# Patient Record
Sex: FEMALE | Race: OTHER | HISPANIC OR LATINO | URBAN - METROPOLITAN AREA
[De-identification: names, ages, dates, MRNs, and addresses within clinical notes are randomized per-mention and may not be internally consistent; named-entity substitution may affect disease eponyms.]

---

## 2023-11-18 ENCOUNTER — INPATIENT (INPATIENT)
Facility: HOSPITAL | Age: 33
LOS: 2 days | Discharge: HOME | DRG: 103 | End: 2023-11-21
Attending: PSYCHIATRY & NEUROLOGY | Admitting: HOSPITALIST
Payer: MEDICAID

## 2023-11-18 VITALS
TEMPERATURE: 98 F | DIASTOLIC BLOOD PRESSURE: 86 MMHG | HEART RATE: 72 BPM | SYSTOLIC BLOOD PRESSURE: 124 MMHG | RESPIRATION RATE: 18 BRPM | OXYGEN SATURATION: 95 %

## 2023-11-18 LAB
ALBUMIN SERPL ELPH-MCNC: 3.3 G/DL — LOW (ref 3.4–5)
ALBUMIN SERPL ELPH-MCNC: 3.3 G/DL — LOW (ref 3.4–5)
ALP SERPL-CCNC: 66 U/L — SIGNIFICANT CHANGE UP (ref 40–120)
ALP SERPL-CCNC: 66 U/L — SIGNIFICANT CHANGE UP (ref 40–120)
ALT FLD-CCNC: 24 U/L — SIGNIFICANT CHANGE UP (ref 12–42)
ALT FLD-CCNC: 24 U/L — SIGNIFICANT CHANGE UP (ref 12–42)
AMPHET UR-MCNC: NEGATIVE — SIGNIFICANT CHANGE UP
AMPHET UR-MCNC: NEGATIVE — SIGNIFICANT CHANGE UP
ANION GAP SERPL CALC-SCNC: 9 MMOL/L — SIGNIFICANT CHANGE UP (ref 9–16)
ANION GAP SERPL CALC-SCNC: 9 MMOL/L — SIGNIFICANT CHANGE UP (ref 9–16)
APPEARANCE UR: CLEAR — SIGNIFICANT CHANGE UP
APPEARANCE UR: CLEAR — SIGNIFICANT CHANGE UP
AST SERPL-CCNC: 13 U/L — LOW (ref 15–37)
AST SERPL-CCNC: 13 U/L — LOW (ref 15–37)
BARBITURATES UR SCN-MCNC: NEGATIVE — SIGNIFICANT CHANGE UP
BARBITURATES UR SCN-MCNC: NEGATIVE — SIGNIFICANT CHANGE UP
BASOPHILS # BLD AUTO: 0.1 K/UL — SIGNIFICANT CHANGE UP (ref 0–0.2)
BASOPHILS # BLD AUTO: 0.1 K/UL — SIGNIFICANT CHANGE UP (ref 0–0.2)
BASOPHILS NFR BLD AUTO: 0.9 % — SIGNIFICANT CHANGE UP (ref 0–2)
BASOPHILS NFR BLD AUTO: 0.9 % — SIGNIFICANT CHANGE UP (ref 0–2)
BENZODIAZ UR-MCNC: NEGATIVE — SIGNIFICANT CHANGE UP
BENZODIAZ UR-MCNC: NEGATIVE — SIGNIFICANT CHANGE UP
BILIRUB SERPL-MCNC: 0.4 MG/DL — SIGNIFICANT CHANGE UP (ref 0.2–1.2)
BILIRUB SERPL-MCNC: 0.4 MG/DL — SIGNIFICANT CHANGE UP (ref 0.2–1.2)
BILIRUB UR-MCNC: NEGATIVE — SIGNIFICANT CHANGE UP
BILIRUB UR-MCNC: NEGATIVE — SIGNIFICANT CHANGE UP
BUN SERPL-MCNC: 9 MG/DL — SIGNIFICANT CHANGE UP (ref 7–23)
BUN SERPL-MCNC: 9 MG/DL — SIGNIFICANT CHANGE UP (ref 7–23)
CALCIUM SERPL-MCNC: 9.1 MG/DL — SIGNIFICANT CHANGE UP (ref 8.5–10.5)
CALCIUM SERPL-MCNC: 9.1 MG/DL — SIGNIFICANT CHANGE UP (ref 8.5–10.5)
CHLORIDE SERPL-SCNC: 101 MMOL/L — SIGNIFICANT CHANGE UP (ref 96–108)
CHLORIDE SERPL-SCNC: 101 MMOL/L — SIGNIFICANT CHANGE UP (ref 96–108)
CO2 SERPL-SCNC: 20 MMOL/L — LOW (ref 22–31)
CO2 SERPL-SCNC: 20 MMOL/L — LOW (ref 22–31)
COCAINE METAB.OTHER UR-MCNC: NEGATIVE — SIGNIFICANT CHANGE UP
COCAINE METAB.OTHER UR-MCNC: NEGATIVE — SIGNIFICANT CHANGE UP
COLOR SPEC: YELLOW — SIGNIFICANT CHANGE UP
COLOR SPEC: YELLOW — SIGNIFICANT CHANGE UP
CREAT SERPL-MCNC: 0.87 MG/DL — SIGNIFICANT CHANGE UP (ref 0.5–1.3)
CREAT SERPL-MCNC: 0.87 MG/DL — SIGNIFICANT CHANGE UP (ref 0.5–1.3)
DIFF PNL FLD: ABNORMAL
DIFF PNL FLD: ABNORMAL
EGFR: 89 ML/MIN/1.73M2 — SIGNIFICANT CHANGE UP
EGFR: 89 ML/MIN/1.73M2 — SIGNIFICANT CHANGE UP
EOSINOPHIL # BLD AUTO: 0.38 K/UL — SIGNIFICANT CHANGE UP (ref 0–0.5)
EOSINOPHIL # BLD AUTO: 0.38 K/UL — SIGNIFICANT CHANGE UP (ref 0–0.5)
EOSINOPHIL NFR BLD AUTO: 3.5 % — SIGNIFICANT CHANGE UP (ref 0–6)
EOSINOPHIL NFR BLD AUTO: 3.5 % — SIGNIFICANT CHANGE UP (ref 0–6)
EPI CELLS # UR: PRESENT
EPI CELLS # UR: PRESENT
ETHANOL SERPL-MCNC: <3 MG/DL — SIGNIFICANT CHANGE UP
ETHANOL SERPL-MCNC: <3 MG/DL — SIGNIFICANT CHANGE UP
GLUCOSE SERPL-MCNC: 91 MG/DL — SIGNIFICANT CHANGE UP (ref 70–99)
GLUCOSE SERPL-MCNC: 91 MG/DL — SIGNIFICANT CHANGE UP (ref 70–99)
GLUCOSE UR QL: NEGATIVE MG/DL — SIGNIFICANT CHANGE UP
GLUCOSE UR QL: NEGATIVE MG/DL — SIGNIFICANT CHANGE UP
HCG UR QL: NEGATIVE — SIGNIFICANT CHANGE UP
HCG UR QL: NEGATIVE — SIGNIFICANT CHANGE UP
HCT VFR BLD CALC: 41.3 % — SIGNIFICANT CHANGE UP (ref 34.5–45)
HCT VFR BLD CALC: 41.3 % — SIGNIFICANT CHANGE UP (ref 34.5–45)
HGB BLD-MCNC: 13.7 G/DL — SIGNIFICANT CHANGE UP (ref 11.5–15.5)
HGB BLD-MCNC: 13.7 G/DL — SIGNIFICANT CHANGE UP (ref 11.5–15.5)
IMM GRANULOCYTES NFR BLD AUTO: 0.6 % — SIGNIFICANT CHANGE UP (ref 0–0.9)
IMM GRANULOCYTES NFR BLD AUTO: 0.6 % — SIGNIFICANT CHANGE UP (ref 0–0.9)
KETONES UR-MCNC: NEGATIVE MG/DL — SIGNIFICANT CHANGE UP
KETONES UR-MCNC: NEGATIVE MG/DL — SIGNIFICANT CHANGE UP
LEUKOCYTE ESTERASE UR-ACNC: ABNORMAL
LEUKOCYTE ESTERASE UR-ACNC: ABNORMAL
LYMPHOCYTES # BLD AUTO: 2.3 K/UL — SIGNIFICANT CHANGE UP (ref 1–3.3)
LYMPHOCYTES # BLD AUTO: 2.3 K/UL — SIGNIFICANT CHANGE UP (ref 1–3.3)
LYMPHOCYTES # BLD AUTO: 21.1 % — SIGNIFICANT CHANGE UP (ref 13–44)
LYMPHOCYTES # BLD AUTO: 21.1 % — SIGNIFICANT CHANGE UP (ref 13–44)
MCHC RBC-ENTMCNC: 27.9 PG — SIGNIFICANT CHANGE UP (ref 27–34)
MCHC RBC-ENTMCNC: 27.9 PG — SIGNIFICANT CHANGE UP (ref 27–34)
MCHC RBC-ENTMCNC: 33.2 GM/DL — SIGNIFICANT CHANGE UP (ref 32–36)
MCHC RBC-ENTMCNC: 33.2 GM/DL — SIGNIFICANT CHANGE UP (ref 32–36)
MCV RBC AUTO: 84.1 FL — SIGNIFICANT CHANGE UP (ref 80–100)
MCV RBC AUTO: 84.1 FL — SIGNIFICANT CHANGE UP (ref 80–100)
METHADONE UR-MCNC: NEGATIVE — SIGNIFICANT CHANGE UP
METHADONE UR-MCNC: NEGATIVE — SIGNIFICANT CHANGE UP
MONOCYTES # BLD AUTO: 0.73 K/UL — SIGNIFICANT CHANGE UP (ref 0–0.9)
MONOCYTES # BLD AUTO: 0.73 K/UL — SIGNIFICANT CHANGE UP (ref 0–0.9)
MONOCYTES NFR BLD AUTO: 6.7 % — SIGNIFICANT CHANGE UP (ref 2–14)
MONOCYTES NFR BLD AUTO: 6.7 % — SIGNIFICANT CHANGE UP (ref 2–14)
NEUTROPHILS # BLD AUTO: 7.31 K/UL — SIGNIFICANT CHANGE UP (ref 1.8–7.4)
NEUTROPHILS # BLD AUTO: 7.31 K/UL — SIGNIFICANT CHANGE UP (ref 1.8–7.4)
NEUTROPHILS NFR BLD AUTO: 67.2 % — SIGNIFICANT CHANGE UP (ref 43–77)
NEUTROPHILS NFR BLD AUTO: 67.2 % — SIGNIFICANT CHANGE UP (ref 43–77)
NITRITE UR-MCNC: NEGATIVE — SIGNIFICANT CHANGE UP
NITRITE UR-MCNC: NEGATIVE — SIGNIFICANT CHANGE UP
NRBC # BLD: 0 /100 WBCS — SIGNIFICANT CHANGE UP (ref 0–0)
NRBC # BLD: 0 /100 WBCS — SIGNIFICANT CHANGE UP (ref 0–0)
OPIATES UR-MCNC: NEGATIVE — SIGNIFICANT CHANGE UP
OPIATES UR-MCNC: NEGATIVE — SIGNIFICANT CHANGE UP
PCP SPEC-MCNC: SIGNIFICANT CHANGE UP
PCP SPEC-MCNC: SIGNIFICANT CHANGE UP
PCP UR-MCNC: NEGATIVE — SIGNIFICANT CHANGE UP
PCP UR-MCNC: NEGATIVE — SIGNIFICANT CHANGE UP
PH UR: 5.5 — SIGNIFICANT CHANGE UP (ref 5–8)
PH UR: 5.5 — SIGNIFICANT CHANGE UP (ref 5–8)
PLATELET # BLD AUTO: 360 K/UL — SIGNIFICANT CHANGE UP (ref 150–400)
PLATELET # BLD AUTO: 360 K/UL — SIGNIFICANT CHANGE UP (ref 150–400)
POTASSIUM SERPL-MCNC: 3.8 MMOL/L — SIGNIFICANT CHANGE UP (ref 3.5–5.3)
POTASSIUM SERPL-MCNC: 3.8 MMOL/L — SIGNIFICANT CHANGE UP (ref 3.5–5.3)
POTASSIUM SERPL-SCNC: 3.8 MMOL/L — SIGNIFICANT CHANGE UP (ref 3.5–5.3)
POTASSIUM SERPL-SCNC: 3.8 MMOL/L — SIGNIFICANT CHANGE UP (ref 3.5–5.3)
PROT SERPL-MCNC: 8.2 G/DL — SIGNIFICANT CHANGE UP (ref 6.4–8.2)
PROT SERPL-MCNC: 8.2 G/DL — SIGNIFICANT CHANGE UP (ref 6.4–8.2)
PROT UR-MCNC: NEGATIVE MG/DL — SIGNIFICANT CHANGE UP
PROT UR-MCNC: NEGATIVE MG/DL — SIGNIFICANT CHANGE UP
RBC # BLD: 4.91 M/UL — SIGNIFICANT CHANGE UP (ref 3.8–5.2)
RBC # BLD: 4.91 M/UL — SIGNIFICANT CHANGE UP (ref 3.8–5.2)
RBC # FLD: 13.7 % — SIGNIFICANT CHANGE UP (ref 10.3–14.5)
RBC # FLD: 13.7 % — SIGNIFICANT CHANGE UP (ref 10.3–14.5)
RBC CASTS # UR COMP ASSIST: 1 /HPF — SIGNIFICANT CHANGE UP (ref 0–4)
RBC CASTS # UR COMP ASSIST: 1 /HPF — SIGNIFICANT CHANGE UP (ref 0–4)
SODIUM SERPL-SCNC: 130 MMOL/L — LOW (ref 132–145)
SODIUM SERPL-SCNC: 130 MMOL/L — LOW (ref 132–145)
SP GR SPEC: 1.03 — HIGH (ref 1–1.03)
SP GR SPEC: 1.03 — HIGH (ref 1–1.03)
THC UR QL: NEGATIVE — SIGNIFICANT CHANGE UP
THC UR QL: NEGATIVE — SIGNIFICANT CHANGE UP
TROPONIN I, HIGH SENSITIVITY RESULT: 4.5 NG/L — SIGNIFICANT CHANGE UP
TROPONIN I, HIGH SENSITIVITY RESULT: 4.5 NG/L — SIGNIFICANT CHANGE UP
UROBILINOGEN FLD QL: 0.2 MG/DL — SIGNIFICANT CHANGE UP (ref 0.2–1)
UROBILINOGEN FLD QL: 0.2 MG/DL — SIGNIFICANT CHANGE UP (ref 0.2–1)
WBC # BLD: 10.89 K/UL — HIGH (ref 3.8–10.5)
WBC # BLD: 10.89 K/UL — HIGH (ref 3.8–10.5)
WBC # FLD AUTO: 10.89 K/UL — HIGH (ref 3.8–10.5)
WBC # FLD AUTO: 10.89 K/UL — HIGH (ref 3.8–10.5)
WBC UR QL: 4 /HPF — SIGNIFICANT CHANGE UP (ref 0–5)
WBC UR QL: 4 /HPF — SIGNIFICANT CHANGE UP (ref 0–5)

## 2023-11-18 PROCEDURE — G0426: CPT

## 2023-11-18 PROCEDURE — 70498 CT ANGIOGRAPHY NECK: CPT | Mod: 26

## 2023-11-18 PROCEDURE — 70450 CT HEAD/BRAIN W/O DYE: CPT | Mod: 26,59

## 2023-11-18 PROCEDURE — ZZZZZ: CPT

## 2023-11-18 PROCEDURE — 99291 CRITICAL CARE FIRST HOUR: CPT

## 2023-11-18 PROCEDURE — 0042T: CPT

## 2023-11-18 PROCEDURE — 70496 CT ANGIOGRAPHY HEAD: CPT | Mod: 26

## 2023-11-18 RX ORDER — MAGNESIUM SULFATE 500 MG/ML
1 VIAL (ML) INJECTION ONCE
Refills: 0 | Status: COMPLETED | OUTPATIENT
Start: 2023-11-18 | End: 2023-11-18

## 2023-11-18 RX ORDER — LIDOCAINE 4 G/100G
1 CREAM TOPICAL EVERY 24 HOURS
Refills: 0 | Status: DISCONTINUED | OUTPATIENT
Start: 2023-11-18 | End: 2023-11-21

## 2023-11-18 RX ORDER — ACETAMINOPHEN 500 MG
1000 TABLET ORAL ONCE
Refills: 0 | Status: COMPLETED | OUTPATIENT
Start: 2023-11-18 | End: 2023-11-18

## 2023-11-18 RX ORDER — ACETAMINOPHEN 500 MG
650 TABLET ORAL EVERY 6 HOURS
Refills: 0 | Status: DISCONTINUED | OUTPATIENT
Start: 2023-11-18 | End: 2023-11-19

## 2023-11-18 RX ORDER — ONDANSETRON 8 MG/1
4 TABLET, FILM COATED ORAL ONCE
Refills: 0 | Status: COMPLETED | OUTPATIENT
Start: 2023-11-18 | End: 2023-11-18

## 2023-11-18 RX ORDER — TRAMADOL HYDROCHLORIDE 50 MG/1
25 TABLET ORAL ONCE
Refills: 0 | Status: DISCONTINUED | OUTPATIENT
Start: 2023-11-18 | End: 2023-11-18

## 2023-11-18 RX ADMIN — LIDOCAINE 1 PATCH: 4 CREAM TOPICAL at 23:01

## 2023-11-18 RX ADMIN — LIDOCAINE 1 PATCH: 4 CREAM TOPICAL at 23:00

## 2023-11-18 RX ADMIN — ONDANSETRON 4 MILLIGRAM(S): 8 TABLET, FILM COATED ORAL at 21:37

## 2023-11-18 RX ADMIN — TRAMADOL HYDROCHLORIDE 25 MILLIGRAM(S): 50 TABLET ORAL at 23:00

## 2023-11-18 RX ADMIN — Medication 400 MILLIGRAM(S): at 20:46

## 2023-11-18 RX ADMIN — Medication 1000 MILLIGRAM(S): at 21:15

## 2023-11-18 RX ADMIN — Medication 100 GRAM(S): at 21:37

## 2023-11-18 NOTE — ED PROVIDER NOTE - OBJECTIVE STATEMENT
: [id number]  32yo otherwise healthy F presents with c/o atypical headache localized to the left side with right sided facial droop. also describes blurry vision and generalized weakness. symptoms started 1 hour pta. denies any similar prior episodes. no meds taken pta. : [id number]  32yo otherwise healthy F presents with c/o atypical headache localized to the left side with right sided facial droop. also describes blurry vision and generalized weakness. symptoms started 1 hour pta after going to the bathroom and were accompanied by 30 minutes aphasia which resolved pta. denies any similar prior episodes. no meds taken pta.

## 2023-11-18 NOTE — ED PROVIDER NOTE - CLINICAL SUMMARY MEDICAL DECISION MAKING FREE TEXT BOX
pt presents with c/o headache, vision changes, full body weakness. noted with right nasolabial fold flattening. NIHSS 1 on arrival, improved to 0 when evaluated with telestroke Dr. Mendez who recommends admission. d/w Dr. Melania Price (neuro) who agrees to accept admission.

## 2023-11-18 NOTE — ED PROVIDER NOTE - CRANIAL NERVE AND PUPILLARY EXAM
flattening of the right nasolabial fold, otherwise CN II-XII intact/central and peripheral vision intact/extra-ocular movements intact/tongue is midline

## 2023-11-18 NOTE — PATIENT PROFILE ADULT - FALL HARM RISK - HARM RISK INTERVENTIONS
Assistance with ambulation/Assistance OOB with selected safe patient handling equipment/Communicate Risk of Fall with Harm to all staff/Monitor gait and stability/Reinforce activity limits and safety measures with patient and family/Sit up slowly, dangle for a short time, stand at bedside before walking/Tailored Fall Risk Interventions/Visual Cue: Yellow wristband and red socks/Bed in lowest position, wheels locked, appropriate side rails in place/Call bell, personal items and telephone in reach/Instruct patient to call for assistance before getting out of bed or chair/Non-slip footwear when patient is out of bed/[place] to call system/Physically safe environment - no spills, clutter or unnecessary equipment/Purposeful Proactive Rounding/Room/bathroom lighting operational, light cord in reach

## 2023-11-18 NOTE — ED ADULT NURSE NOTE - OBJECTIVE STATEMENT
c/o headache and left sided weakness LKW 1hr PTA.  code stroke activated. pt states to Dr Huynh she had 30min of loss of speech . c/o headache and left sided weakness after syncopal episode while in the bathroom, LKW 1hr PTA approx 15:32.  code stroke activated. pt states to Dr Huynh she had 30min of loss of speech after syncopal episode .  Analy [id number].

## 2023-11-18 NOTE — STROKE CODE NOTE - ASSESSMENT/PLAN
33 year old previously healthy , history of anxiety, 33 year old previously healthy , history of anxiety, 1 hour prior to presentation, while she was in the bathroom, she felt nauseous and started trembling and had an episode of syncope. She said that she did not hit her head. She has history of " migraine" she has been having headache for the last 3 days. She said that that when she could not speak or understand language for 30 minutes after the syncope.  In the ED, her NIHSS was 0. She has baseline strabismus.  CT head no ICH , no acute abnormality   CT PERFUSION: Within normal limits    CTA INTRACRANIAL: Within normal limits. No arterial steno-occlusive   disease.    CTA EXTRACRANIAL: Within normal limits. No arterial steno-occlusive   disease or evidence of dissection.    not tA candidat2 given NIHSS0   r/o stroke, would get MRI brain   hold aspirin for now until the MRI, given headache, less likely to be SAH given no acute onset headache   syncope workup, EEG     Transfer to  for further workup  in house neurology consult for further management and workup     Denice Huynh  Plan discussed with ED team

## 2023-11-18 NOTE — STROKE CODE NOTE - IV ALTEPLASE EXCL REL HIDDEN
Problem: Potential for Falls  Goal: Patient will remain free of falls  Description: INTERVENTIONS:  - Educate patient/family on patient safety including physical limitations  - Instruct patient to call for assistance with activity   - Consult OT/PT to assist with strengthening/mobility   - Keep Call bell within reach  - Keep bed low and locked with side rails adjusted as appropriate  - Keep care items and personal belongings within reach  - Initiate and maintain comfort rounds  - Make Fall Risk Sign visible to staff  - Offer Toileting every  Hours, in advance of need  - Initiate/Maintain alarm  - Obtain necessary fall risk management equipment  - Apply yellow socks and bracelet for high fall risk patients  - Consider moving patient to room near nurses station  Outcome: Progressing     Problem: MOBILITY - ADULT  Goal: Maintain or return to baseline ADL function  Description: INTERVENTIONS:  -  Assess patient's ability to carry out ADLs; assess patient's baseline for ADL function and identify physical deficits which impact ability to perform ADLs (bathing, care of mouth/teeth, toileting, grooming, dressing, etc )  - Assess/evaluate cause of self-care deficits   - Assess range of motion  - Assess patient's mobility; develop plan if impaired  - Assess patient's need for assistive devices and provide as appropriate  - Encourage maximum independence but intervene and supervise when necessary  - Involve family in performance of ADLs  - Assess for home care needs following discharge   - Consider OT consult to assist with ADL evaluation and planning for discharge  - Provide patient education as appropriate  Outcome: Progressing     Problem: PAIN - ADULT  Goal: Verbalizes/displays adequate comfort level or baseline comfort level  Description: Interventions:  - Encourage patient to monitor pain and request assistance  - Assess pain using appropriate pain scale  - Administer analgesics based on type and severity of pain and show evaluate response  - Implement non-pharmacological measures as appropriate and evaluate response  - Consider cultural and social influences on pain and pain management  - Notify physician/advanced practitioner if interventions unsuccessful or patient reports new pain  Outcome: Progressing

## 2023-11-18 NOTE — ED PROVIDER NOTE - CRITICAL CARE ATTENDING CONTRIBUTION TO CARE
The patient was seen immediately upon arrival due to a high probability of imminent or life-threatening deterioration secondary to r/o cva , which required my direct attention, intervention, and personal management at the bedside. I have personally provided critical care time exclusive of time spent on separately billable procedures. Time includes review of laboratory data, radiology results, discussion with consultants, discussion with the patient's family, and monitoring for potential decompensation.

## 2023-11-18 NOTE — H&P ADULT - NSHPLABSRESULTS_GEN_ALL_CORE
Fingerstick Blood Glucose: CAPILLARY BLOOD GLUCOSE      POCT Blood Glucose.: 80 mg/dL (2023 16:29)    LABS:                        13.7   10.89 )-----------( 360      ( 2023 16:28 )             41.3     1118    130<L>  |  101  |  9   ----------------------------<  91  3.8   |  20<L>  |  0.87    Ca    9.1      2023 16:28    TPro  8.2  /  Alb  3.3<L>  /  TBili  0.4  /  DBili  x   /  AST  13<L>  /  ALT  24  /  AlkPhos  66  18          Urinalysis Basic - ( 2023 16:29 )    Color: Yellow / Appearance: Clear / S.034 / pH: x  Gluc: x / Ketone: Negative mg/dL  / Bili: Negative / Urobili: 0.2 mg/dL   Blood: x / Protein: Negative mg/dL / Nitrite: Negative   Leuk Esterase: Small / RBC: 1 /HPF / WBC 4 /HPF   Sq Epi: x / Non Sq Epi: x / Bacteria: x        RADIOLOGY  < from: CT Brain Stroke Protocol (23 @ 16:37) >    IMPRESSION:  No acute intracranial hemorrhage, demarcated territorial infarct, or   masslike lesion.    < end of copied text >    < from: CT Angio Neck Stroke Protocol w/ IV Cont (23 @ 17:01) >    IMPRESSION:      CT PERFUSION: Within normal limits    CTA INTRACRANIAL: Within normal limits. No arterial steno-occlusive   disease.    CTA EXTRACRANIAL: Within normal limits. No arterial steno-occlusive   disease or evidence of dissection.    < end of copied text >

## 2023-11-18 NOTE — ED ADULT NURSE NOTE - NSFALLUNIVINTERV_ED_ALL_ED
Bed/Stretcher in lowest position, wheels locked, appropriate side rails in place/Call bell, personal items and telephone in reach/Instruct patient to call for assistance before getting out of bed/chair/stretcher/Non-slip footwear applied when patient is off stretcher/[place] to call system/Physically safe environment - no spills, clutter or unnecessary equipment/Purposeful proactive rounding/Room/bathroom lighting operational, light cord in reach

## 2023-11-18 NOTE — H&P ADULT - NSHPREVIEWOFSYSTEMS_GEN_ALL_CORE
Constitutional: No fever, weight loss or fatigue  Eyes: b/l eyelid puffiness   ENMT:  No difficulty hearing, tinnitus, vertigo; No sinus or throat pain  Neck: No pain or stiffness  Respiratory: +cough  Cardiovascular: No chest pain, palpitations, shortness of breath, dizziness or leg swelling  Gastrointestinal: No abdominal pain. No nausea, vomiting or hematemesis; No diarrhea or constipation. Nohematochezia.  Genitourinary: No dysuria, frequency, hematuria or incontinence  Neurological: As per HPI

## 2023-11-18 NOTE — STROKE CODE NOTE - IMAGING RESULTS
Non-hemorrhagic Nsaids Pregnancy And Lactation Text: These medications are considered safe up to 30 weeks gestation. It is excreted in breast milk.

## 2023-11-18 NOTE — H&P ADULT - HISTORY OF PRESENT ILLNESS
**STROKE HPI***    HPI: 33y Female with PMHx of anxiety presented to [facility] with severe headache and syncopal episdoe followed by generalized weakness, full body tremors and difficulty with speaking. Patient has been having progressive worsening headache over the past 3 days without any resolution in head pain. She has tried ibuprofen without relief. She recalls sitting on the toilet urinating and then fainting without head strike. She had to be helped to bed by her son. She noted full body tremors (not myoclonus activity) and was not able to produce any speech nor comprehend language that was spoken to her. Language disturbance lasted for 15-20 minutes. She also noted left sided weakness and decreased sensation after syncopal episode as well as dizziness sensation and blurry vision.     [facility]: Stroke code called. NIHSS 0. CT imaging unrevealing. Low suspicion for SAH due to non acute progressive nature. Transferred to [facility] for further neuro work up.     Patient arrived to floor with 10/10 diffuse head pain with some localization along right eye "pain behind the eye" and radiating pain down neck and to lower back, nauseous. IV tylenol, 1g Mg, lidocaine patches and tramadol 25mg x1 given with some improvement. Head pain worsens with light and sound, improves with cold compress. Patient's current head pain is similar to her recent hx of migraines, only this time is more severe with associated syncope and language difficulty. Currently vision is back to baseline (clear), and no dizziness.     Migraines started 6 months ago with no triggering events. Headache episode are often, as much as 4-5 times a month. She does have an aura involving generalized body heaviness, blurry vision and dizziness before headaches start. Usually headaches are 9/10 pain, lasting 3-4 days, but can go up to 5 days. She has associated photophobia and phonophobia. The only thing that helps are cold compressess and sleep. She kinga does have a recent of a slipping on stairs with head strike 1 month before her headaches started. She was not evaluated by medical personnel, no head imaging at that time.

## 2023-11-18 NOTE — H&P ADULT - NSHPPHYSICALEXAM_GEN_ALL_CORE
Statement Selected
General: Awake and alert  Cardiovascular: Regular rate and rhythm on tele  Pulmonary: No use of accessory muscles  GI: Abdomen soft, non-tender, non-distended  Extremities: no edema    Neurologic:  -Mental status: Awake, alert, oriented to person, place, and time. Speech is fluent with intact naming, repetition, and comprehension, no dysarthria. Recent and remote memory intact. Follows commands. Attention/concentration intact. Fund of knowledge appropriate.  -Cranial nerves:   II: Visual fields are full to confrontation.  III, IV, VI: Extraocular movements are intact without nystagmus. Right eye esotropia (baseline, childhood strabismus), does not bury on right gaze. Pupils equally round and reactive to light  V:  Facial sensation V1-V3 equal and intact   VII: Subtle R NLFF  VIII: Hearing is bilaterally intact to finger rub  XII: Tongue protrudes midline  Motor: Normal bulk and tone. Strength RUE 3/5, LUE 4/5. LUE drifts and hits bed, pain limited. Right leg at least 3/5.   Sensation: Decreased sensation along left arm and leg. No neglect or extinction on double simultaneous testing.  Coordination: No dysmetria on finger-to-nose bilaterally    NIHSS: 5

## 2023-11-18 NOTE — H&P ADULT - ASSESSMENT
33y Female with PMHx of anxiety and migraines (not officially diagnosed) presented to [facility] with severe headache x 3 days associated with dizziness and blurry vision, followed by syncopal episode  with generalized weakness, full body tremors and mixed aphasia lasting for 15 minutes. [facility] NIHSS 1. CT imaging unremarkable. Transferred for further work up. [facility] NIHSS 5.    Neuro  #r/o CVS/IPH/SAH vs migraine   - hold antiplatelet until MRI r/o SAH  - q4hr stroke neuro checks and vitals  - obtain MRI Brain with and without contrast with GRE and SWI slices  - Stroke Code HCT Results: neg  - Stroke Code CTA Results: neg  - Stroke education    #likely migraine  - c/w migraine cocktail prn  - c/w tylenol 650mg q6  - can add depakote, tramadol for further pain management  - consider starting topamax if MRI negative for bleed/stroke    Cards  - SBP < 140 r/o SAH  - EKG Results:NSR  - LDL results:     Pulm  - call provider if SPO2 < 94%    GI  #Nutrition/Fluids/Electrolytes   - replete K<4 and Mg <2  - Diet: Regular    Renal  #hyponatremia  - f/u am labs. If continues to by hyponatremic, send urine lytes and serum osmolality    Endocrine  - A1C results:   - TSH results:    DVT Prophylaxis  - SCDs for DVT prophylaxis       Dispo: pending PT/OT      Discussed daily hospital plans and goals with patient.     Discussed with Neurology Attending Dr. Tidwell

## 2023-11-19 LAB
A1C WITH ESTIMATED AVERAGE GLUCOSE RESULT: 5.1 % — SIGNIFICANT CHANGE UP (ref 4–5.6)
A1C WITH ESTIMATED AVERAGE GLUCOSE RESULT: 5.1 % — SIGNIFICANT CHANGE UP (ref 4–5.6)
ANION GAP SERPL CALC-SCNC: 8 MMOL/L — SIGNIFICANT CHANGE UP (ref 5–17)
ANION GAP SERPL CALC-SCNC: 8 MMOL/L — SIGNIFICANT CHANGE UP (ref 5–17)
BASOPHILS # BLD AUTO: 0.08 K/UL — SIGNIFICANT CHANGE UP (ref 0–0.2)
BASOPHILS # BLD AUTO: 0.08 K/UL — SIGNIFICANT CHANGE UP (ref 0–0.2)
BASOPHILS NFR BLD AUTO: 1.3 % — SIGNIFICANT CHANGE UP (ref 0–2)
BASOPHILS NFR BLD AUTO: 1.3 % — SIGNIFICANT CHANGE UP (ref 0–2)
BUN SERPL-MCNC: 9 MG/DL — SIGNIFICANT CHANGE UP (ref 7–23)
BUN SERPL-MCNC: 9 MG/DL — SIGNIFICANT CHANGE UP (ref 7–23)
CALCIUM SERPL-MCNC: 8.9 MG/DL — SIGNIFICANT CHANGE UP (ref 8.4–10.5)
CALCIUM SERPL-MCNC: 8.9 MG/DL — SIGNIFICANT CHANGE UP (ref 8.4–10.5)
CHLORIDE SERPL-SCNC: 103 MMOL/L — SIGNIFICANT CHANGE UP (ref 96–108)
CHLORIDE SERPL-SCNC: 103 MMOL/L — SIGNIFICANT CHANGE UP (ref 96–108)
CHOLEST SERPL-MCNC: 187 MG/DL — SIGNIFICANT CHANGE UP
CHOLEST SERPL-MCNC: 187 MG/DL — SIGNIFICANT CHANGE UP
CO2 SERPL-SCNC: 25 MMOL/L — SIGNIFICANT CHANGE UP (ref 22–31)
CO2 SERPL-SCNC: 25 MMOL/L — SIGNIFICANT CHANGE UP (ref 22–31)
CREAT SERPL-MCNC: 0.76 MG/DL — SIGNIFICANT CHANGE UP (ref 0.5–1.3)
CREAT SERPL-MCNC: 0.76 MG/DL — SIGNIFICANT CHANGE UP (ref 0.5–1.3)
EGFR: 105 ML/MIN/1.73M2 — SIGNIFICANT CHANGE UP
EGFR: 105 ML/MIN/1.73M2 — SIGNIFICANT CHANGE UP
EOSINOPHIL # BLD AUTO: 0.48 K/UL — SIGNIFICANT CHANGE UP (ref 0–0.5)
EOSINOPHIL # BLD AUTO: 0.48 K/UL — SIGNIFICANT CHANGE UP (ref 0–0.5)
EOSINOPHIL NFR BLD AUTO: 7.9 % — HIGH (ref 0–6)
EOSINOPHIL NFR BLD AUTO: 7.9 % — HIGH (ref 0–6)
ESTIMATED AVERAGE GLUCOSE: 100 MG/DL — SIGNIFICANT CHANGE UP (ref 68–114)
ESTIMATED AVERAGE GLUCOSE: 100 MG/DL — SIGNIFICANT CHANGE UP (ref 68–114)
GLUCOSE SERPL-MCNC: 81 MG/DL — SIGNIFICANT CHANGE UP (ref 70–99)
GLUCOSE SERPL-MCNC: 81 MG/DL — SIGNIFICANT CHANGE UP (ref 70–99)
HCT VFR BLD CALC: 42.2 % — SIGNIFICANT CHANGE UP (ref 34.5–45)
HCT VFR BLD CALC: 42.2 % — SIGNIFICANT CHANGE UP (ref 34.5–45)
HDLC SERPL-MCNC: 72 MG/DL — SIGNIFICANT CHANGE UP
HDLC SERPL-MCNC: 72 MG/DL — SIGNIFICANT CHANGE UP
HGB BLD-MCNC: 13.7 G/DL — SIGNIFICANT CHANGE UP (ref 11.5–15.5)
HGB BLD-MCNC: 13.7 G/DL — SIGNIFICANT CHANGE UP (ref 11.5–15.5)
IMM GRANULOCYTES NFR BLD AUTO: 0.2 % — SIGNIFICANT CHANGE UP (ref 0–0.9)
IMM GRANULOCYTES NFR BLD AUTO: 0.2 % — SIGNIFICANT CHANGE UP (ref 0–0.9)
LIPID PNL WITH DIRECT LDL SERPL: 105 MG/DL — HIGH
LIPID PNL WITH DIRECT LDL SERPL: 105 MG/DL — HIGH
LYMPHOCYTES # BLD AUTO: 2.12 K/UL — SIGNIFICANT CHANGE UP (ref 1–3.3)
LYMPHOCYTES # BLD AUTO: 2.12 K/UL — SIGNIFICANT CHANGE UP (ref 1–3.3)
LYMPHOCYTES # BLD AUTO: 35 % — SIGNIFICANT CHANGE UP (ref 13–44)
LYMPHOCYTES # BLD AUTO: 35 % — SIGNIFICANT CHANGE UP (ref 13–44)
MAGNESIUM SERPL-MCNC: 2.5 MG/DL — SIGNIFICANT CHANGE UP (ref 1.6–2.6)
MAGNESIUM SERPL-MCNC: 2.5 MG/DL — SIGNIFICANT CHANGE UP (ref 1.6–2.6)
MCHC RBC-ENTMCNC: 27 PG — SIGNIFICANT CHANGE UP (ref 27–34)
MCHC RBC-ENTMCNC: 27 PG — SIGNIFICANT CHANGE UP (ref 27–34)
MCHC RBC-ENTMCNC: 32.5 GM/DL — SIGNIFICANT CHANGE UP (ref 32–36)
MCHC RBC-ENTMCNC: 32.5 GM/DL — SIGNIFICANT CHANGE UP (ref 32–36)
MCV RBC AUTO: 83.2 FL — SIGNIFICANT CHANGE UP (ref 80–100)
MCV RBC AUTO: 83.2 FL — SIGNIFICANT CHANGE UP (ref 80–100)
MONOCYTES # BLD AUTO: 0.61 K/UL — SIGNIFICANT CHANGE UP (ref 0–0.9)
MONOCYTES # BLD AUTO: 0.61 K/UL — SIGNIFICANT CHANGE UP (ref 0–0.9)
MONOCYTES NFR BLD AUTO: 10.1 % — SIGNIFICANT CHANGE UP (ref 2–14)
MONOCYTES NFR BLD AUTO: 10.1 % — SIGNIFICANT CHANGE UP (ref 2–14)
NEUTROPHILS # BLD AUTO: 2.75 K/UL — SIGNIFICANT CHANGE UP (ref 1.8–7.4)
NEUTROPHILS # BLD AUTO: 2.75 K/UL — SIGNIFICANT CHANGE UP (ref 1.8–7.4)
NEUTROPHILS NFR BLD AUTO: 45.5 % — SIGNIFICANT CHANGE UP (ref 43–77)
NEUTROPHILS NFR BLD AUTO: 45.5 % — SIGNIFICANT CHANGE UP (ref 43–77)
NON HDL CHOLESTEROL: 115 MG/DL — SIGNIFICANT CHANGE UP
NON HDL CHOLESTEROL: 115 MG/DL — SIGNIFICANT CHANGE UP
NRBC # BLD: 0 /100 WBCS — SIGNIFICANT CHANGE UP (ref 0–0)
NRBC # BLD: 0 /100 WBCS — SIGNIFICANT CHANGE UP (ref 0–0)
PHOSPHATE SERPL-MCNC: 3.9 MG/DL — SIGNIFICANT CHANGE UP (ref 2.5–4.5)
PHOSPHATE SERPL-MCNC: 3.9 MG/DL — SIGNIFICANT CHANGE UP (ref 2.5–4.5)
PLATELET # BLD AUTO: 337 K/UL — SIGNIFICANT CHANGE UP (ref 150–400)
PLATELET # BLD AUTO: 337 K/UL — SIGNIFICANT CHANGE UP (ref 150–400)
POTASSIUM SERPL-MCNC: 3.8 MMOL/L — SIGNIFICANT CHANGE UP (ref 3.5–5.3)
POTASSIUM SERPL-MCNC: 3.8 MMOL/L — SIGNIFICANT CHANGE UP (ref 3.5–5.3)
POTASSIUM SERPL-SCNC: 3.8 MMOL/L — SIGNIFICANT CHANGE UP (ref 3.5–5.3)
POTASSIUM SERPL-SCNC: 3.8 MMOL/L — SIGNIFICANT CHANGE UP (ref 3.5–5.3)
RBC # BLD: 5.07 M/UL — SIGNIFICANT CHANGE UP (ref 3.8–5.2)
RBC # BLD: 5.07 M/UL — SIGNIFICANT CHANGE UP (ref 3.8–5.2)
RBC # FLD: 13.6 % — SIGNIFICANT CHANGE UP (ref 10.3–14.5)
RBC # FLD: 13.6 % — SIGNIFICANT CHANGE UP (ref 10.3–14.5)
SODIUM SERPL-SCNC: 136 MMOL/L — SIGNIFICANT CHANGE UP (ref 135–145)
SODIUM SERPL-SCNC: 136 MMOL/L — SIGNIFICANT CHANGE UP (ref 135–145)
TRIGL SERPL-MCNC: 51 MG/DL — SIGNIFICANT CHANGE UP
TRIGL SERPL-MCNC: 51 MG/DL — SIGNIFICANT CHANGE UP
TSH SERPL-MCNC: 3.43 UIU/ML — SIGNIFICANT CHANGE UP (ref 0.27–4.2)
TSH SERPL-MCNC: 3.43 UIU/ML — SIGNIFICANT CHANGE UP (ref 0.27–4.2)
WBC # BLD: 6.05 K/UL — SIGNIFICANT CHANGE UP (ref 3.8–10.5)
WBC # BLD: 6.05 K/UL — SIGNIFICANT CHANGE UP (ref 3.8–10.5)
WBC # FLD AUTO: 6.05 K/UL — SIGNIFICANT CHANGE UP (ref 3.8–10.5)
WBC # FLD AUTO: 6.05 K/UL — SIGNIFICANT CHANGE UP (ref 3.8–10.5)

## 2023-11-19 PROCEDURE — 93971 EXTREMITY STUDY: CPT | Mod: 26,LT

## 2023-11-19 PROCEDURE — 70551 MRI BRAIN STEM W/O DYE: CPT | Mod: 26

## 2023-11-19 PROCEDURE — 99254 IP/OBS CNSLTJ NEW/EST MOD 60: CPT

## 2023-11-19 RX ORDER — METOCLOPRAMIDE HCL 10 MG
10 TABLET ORAL EVERY 6 HOURS
Refills: 0 | Status: COMPLETED | OUTPATIENT
Start: 2023-11-19 | End: 2023-11-20

## 2023-11-19 RX ORDER — VALPROIC ACID (AS SODIUM SALT) 250 MG/5ML
500 SOLUTION, ORAL ORAL EVERY 8 HOURS
Refills: 0 | Status: COMPLETED | OUTPATIENT
Start: 2023-11-19 | End: 2023-11-20

## 2023-11-19 RX ORDER — TRAMADOL HYDROCHLORIDE 50 MG/1
100 TABLET ORAL EVERY 6 HOURS
Refills: 0 | Status: DISCONTINUED | OUTPATIENT
Start: 2023-11-19 | End: 2023-11-20

## 2023-11-19 RX ORDER — ACETAMINOPHEN 500 MG
650 TABLET ORAL EVERY 8 HOURS
Refills: 0 | Status: DISCONTINUED | OUTPATIENT
Start: 2023-11-19 | End: 2023-11-21

## 2023-11-19 RX ORDER — METOCLOPRAMIDE HCL 10 MG
10 TABLET ORAL ONCE
Refills: 0 | Status: COMPLETED | OUTPATIENT
Start: 2023-11-19 | End: 2023-11-19

## 2023-11-19 RX ORDER — MAGNESIUM SULFATE 500 MG/ML
1 VIAL (ML) INJECTION ONCE
Refills: 0 | Status: COMPLETED | OUTPATIENT
Start: 2023-11-19 | End: 2023-11-19

## 2023-11-19 RX ORDER — DULOXETINE HYDROCHLORIDE 30 MG/1
30 CAPSULE, DELAYED RELEASE ORAL DAILY
Refills: 0 | Status: DISCONTINUED | OUTPATIENT
Start: 2023-11-19 | End: 2023-11-21

## 2023-11-19 RX ORDER — TRAMADOL HYDROCHLORIDE 50 MG/1
25 TABLET ORAL ONCE
Refills: 0 | Status: DISCONTINUED | OUTPATIENT
Start: 2023-11-19 | End: 2023-11-19

## 2023-11-19 RX ADMIN — TRAMADOL HYDROCHLORIDE 25 MILLIGRAM(S): 50 TABLET ORAL at 06:31

## 2023-11-19 RX ADMIN — Medication 55 MILLIGRAM(S): at 16:33

## 2023-11-19 RX ADMIN — Medication 650 MILLIGRAM(S): at 10:20

## 2023-11-19 RX ADMIN — Medication 650 MILLIGRAM(S): at 12:20

## 2023-11-19 RX ADMIN — TRAMADOL HYDROCHLORIDE 25 MILLIGRAM(S): 50 TABLET ORAL at 00:40

## 2023-11-19 RX ADMIN — Medication 1 CAPSULE(S): at 16:34

## 2023-11-19 RX ADMIN — LIDOCAINE 1 PATCH: 4 CREAM TOPICAL at 07:04

## 2023-11-19 RX ADMIN — Medication 10 MILLIGRAM(S): at 15:07

## 2023-11-19 RX ADMIN — TRAMADOL HYDROCHLORIDE 100 MILLIGRAM(S): 50 TABLET ORAL at 16:30

## 2023-11-19 RX ADMIN — Medication 650 MILLIGRAM(S): at 05:07

## 2023-11-19 RX ADMIN — TRAMADOL HYDROCHLORIDE 100 MILLIGRAM(S): 50 TABLET ORAL at 15:07

## 2023-11-19 RX ADMIN — Medication 650 MILLIGRAM(S): at 03:49

## 2023-11-19 RX ADMIN — TRAMADOL HYDROCHLORIDE 100 MILLIGRAM(S): 50 TABLET ORAL at 22:36

## 2023-11-19 RX ADMIN — DULOXETINE HYDROCHLORIDE 30 MILLIGRAM(S): 30 CAPSULE, DELAYED RELEASE ORAL at 16:34

## 2023-11-19 RX ADMIN — Medication 55 MILLIGRAM(S): at 21:09

## 2023-11-19 RX ADMIN — Medication 1 CAPSULE(S): at 17:30

## 2023-11-19 RX ADMIN — Medication 10 MILLIGRAM(S): at 22:36

## 2023-11-19 RX ADMIN — Medication 300 GRAM(S): at 03:55

## 2023-11-19 RX ADMIN — Medication 650 MILLIGRAM(S): at 22:36

## 2023-11-19 RX ADMIN — TRAMADOL HYDROCHLORIDE 25 MILLIGRAM(S): 50 TABLET ORAL at 08:00

## 2023-11-19 RX ADMIN — LIDOCAINE 1 PATCH: 4 CREAM TOPICAL at 11:35

## 2023-11-19 RX ADMIN — Medication 10 MILLIGRAM(S): at 03:49
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
## 2023-11-19 NOTE — OCCUPATIONAL THERAPY INITIAL EVALUATION ADULT - VISUAL ASSESSMENT: TRACKING
Per chart pt with R eye strabismus from childhood so R eye slightly sluggish however visual tracking WFL bilaterally

## 2023-11-19 NOTE — OCCUPATIONAL THERAPY INITIAL EVALUATION ADULT - DIAGNOSIS, OT EVAL
Pt presents with slightly decreased LUE/LLE sensation and headaches impacting their ability to independently complete ADLs, functional transfers, and functional mobility.

## 2023-11-19 NOTE — OCCUPATIONAL THERAPY INITIAL EVALUATION ADULT - ADDITIONAL COMMENTS
Pt reports living with her  and children in a building with elevator access and 0STE. Pt reports that prior to admission, pt was independent in all ADLs and IADLs, and ambulates with no device. Pt is R hand dominant, wears glasses for distance.
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
## 2023-11-19 NOTE — OCCUPATIONAL THERAPY INITIAL EVALUATION ADULT - GENERAL OBSERVATIONS, REHAB EVAL
OT IE Completed. MRS 1. Pt's CONCEPCION Smith cleared pt for OT. Pt received semisupine in bed, +tele, +heplock, room air, NAD, agreeable to OT. PT Wandy present. Pt tolerated session fairly, limited by headache +photophobia and back pain. Pt left as found, all lines in tact, needs in reach. RN aware.

## 2023-11-19 NOTE — PHYSICAL THERAPY INITIAL EVALUATION ADULT - MODALITIES TREATMENT COMMENTS
CN: facial strength, symmetry, sensation intact, hearing intact, vision/oculomotor intact w/o nystagmus, except mild R eye strabismus - chronic.  Shoulder shrug intact , but painful ROM on the L. Tongue protrudes at midline. Speech fluent w/o word finding difficulties.

## 2023-11-19 NOTE — PHYSICAL THERAPY INITIAL EVALUATION ADULT - ADDITIONAL COMMENTS
Pt. currently residing in an apt. building with elevator access, ambulatory w/o assistive device, independent with ADL>

## 2023-11-19 NOTE — OCCUPATIONAL THERAPY INITIAL EVALUATION ADULT - MODIFIED CLINICAL TEST OF SENSORY INTEGRATION IN BALANCE TEST
Pt performed functional mobility in room with no device and CGA approx 10ftx2. Distance limited by headache with photophobia and back pain. Pt reports feeling slightly unsteady compared to normal.

## 2023-11-19 NOTE — OCCUPATIONAL THERAPY INITIAL EVALUATION ADULT - RANGE OF MOTION EXAMINATION, UPPER EXTREMITY
bilateral UE Active ROM was WNL (within normal limits)/bilateral UE Passive ROM was WNL (within normal limits)
normal...

## 2023-11-20 DIAGNOSIS — F41.9 ANXIETY DISORDER, UNSPECIFIED: ICD-10-CM

## 2023-11-20 DIAGNOSIS — R51.9 HEADACHE, UNSPECIFIED: ICD-10-CM

## 2023-11-20 LAB
ANION GAP SERPL CALC-SCNC: 11 MMOL/L — SIGNIFICANT CHANGE UP (ref 5–17)
ANION GAP SERPL CALC-SCNC: 11 MMOL/L — SIGNIFICANT CHANGE UP (ref 5–17)
BUN SERPL-MCNC: 8 MG/DL — SIGNIFICANT CHANGE UP (ref 7–23)
BUN SERPL-MCNC: 8 MG/DL — SIGNIFICANT CHANGE UP (ref 7–23)
CALCIUM SERPL-MCNC: 8.8 MG/DL — SIGNIFICANT CHANGE UP (ref 8.4–10.5)
CALCIUM SERPL-MCNC: 8.8 MG/DL — SIGNIFICANT CHANGE UP (ref 8.4–10.5)
CHLORIDE SERPL-SCNC: 100 MMOL/L — SIGNIFICANT CHANGE UP (ref 96–108)
CHLORIDE SERPL-SCNC: 100 MMOL/L — SIGNIFICANT CHANGE UP (ref 96–108)
CO2 SERPL-SCNC: 25 MMOL/L — SIGNIFICANT CHANGE UP (ref 22–31)
CO2 SERPL-SCNC: 25 MMOL/L — SIGNIFICANT CHANGE UP (ref 22–31)
CREAT SERPL-MCNC: 0.78 MG/DL — SIGNIFICANT CHANGE UP (ref 0.5–1.3)
CREAT SERPL-MCNC: 0.78 MG/DL — SIGNIFICANT CHANGE UP (ref 0.5–1.3)
EGFR: 102 ML/MIN/1.73M2 — SIGNIFICANT CHANGE UP
EGFR: 102 ML/MIN/1.73M2 — SIGNIFICANT CHANGE UP
GLUCOSE BLDC GLUCOMTR-MCNC: 84 MG/DL — SIGNIFICANT CHANGE UP (ref 70–99)
GLUCOSE BLDC GLUCOMTR-MCNC: 84 MG/DL — SIGNIFICANT CHANGE UP (ref 70–99)
GLUCOSE SERPL-MCNC: 87 MG/DL — SIGNIFICANT CHANGE UP (ref 70–99)
GLUCOSE SERPL-MCNC: 87 MG/DL — SIGNIFICANT CHANGE UP (ref 70–99)
HCT VFR BLD CALC: 41.1 % — SIGNIFICANT CHANGE UP (ref 34.5–45)
HCT VFR BLD CALC: 41.1 % — SIGNIFICANT CHANGE UP (ref 34.5–45)
HGB BLD-MCNC: 13.6 G/DL — SIGNIFICANT CHANGE UP (ref 11.5–15.5)
HGB BLD-MCNC: 13.6 G/DL — SIGNIFICANT CHANGE UP (ref 11.5–15.5)
MAGNESIUM SERPL-MCNC: 2.2 MG/DL — SIGNIFICANT CHANGE UP (ref 1.6–2.6)
MAGNESIUM SERPL-MCNC: 2.2 MG/DL — SIGNIFICANT CHANGE UP (ref 1.6–2.6)
MCHC RBC-ENTMCNC: 27.1 PG — SIGNIFICANT CHANGE UP (ref 27–34)
MCHC RBC-ENTMCNC: 27.1 PG — SIGNIFICANT CHANGE UP (ref 27–34)
MCHC RBC-ENTMCNC: 33.1 GM/DL — SIGNIFICANT CHANGE UP (ref 32–36)
MCHC RBC-ENTMCNC: 33.1 GM/DL — SIGNIFICANT CHANGE UP (ref 32–36)
MCV RBC AUTO: 81.9 FL — SIGNIFICANT CHANGE UP (ref 80–100)
MCV RBC AUTO: 81.9 FL — SIGNIFICANT CHANGE UP (ref 80–100)
NRBC # BLD: 0 /100 WBCS — SIGNIFICANT CHANGE UP (ref 0–0)
NRBC # BLD: 0 /100 WBCS — SIGNIFICANT CHANGE UP (ref 0–0)
PHOSPHATE SERPL-MCNC: 3.2 MG/DL — SIGNIFICANT CHANGE UP (ref 2.5–4.5)
PHOSPHATE SERPL-MCNC: 3.2 MG/DL — SIGNIFICANT CHANGE UP (ref 2.5–4.5)
PLATELET # BLD AUTO: 347 K/UL — SIGNIFICANT CHANGE UP (ref 150–400)
PLATELET # BLD AUTO: 347 K/UL — SIGNIFICANT CHANGE UP (ref 150–400)
POTASSIUM SERPL-MCNC: 3.8 MMOL/L — SIGNIFICANT CHANGE UP (ref 3.5–5.3)
POTASSIUM SERPL-MCNC: 3.8 MMOL/L — SIGNIFICANT CHANGE UP (ref 3.5–5.3)
POTASSIUM SERPL-SCNC: 3.8 MMOL/L — SIGNIFICANT CHANGE UP (ref 3.5–5.3)
POTASSIUM SERPL-SCNC: 3.8 MMOL/L — SIGNIFICANT CHANGE UP (ref 3.5–5.3)
RBC # BLD: 5.02 M/UL — SIGNIFICANT CHANGE UP (ref 3.8–5.2)
RBC # BLD: 5.02 M/UL — SIGNIFICANT CHANGE UP (ref 3.8–5.2)
RBC # FLD: 13.4 % — SIGNIFICANT CHANGE UP (ref 10.3–14.5)
RBC # FLD: 13.4 % — SIGNIFICANT CHANGE UP (ref 10.3–14.5)
SODIUM SERPL-SCNC: 136 MMOL/L — SIGNIFICANT CHANGE UP (ref 135–145)
SODIUM SERPL-SCNC: 136 MMOL/L — SIGNIFICANT CHANGE UP (ref 135–145)
WBC # BLD: 7.16 K/UL — SIGNIFICANT CHANGE UP (ref 3.8–10.5)
WBC # BLD: 7.16 K/UL — SIGNIFICANT CHANGE UP (ref 3.8–10.5)
WBC # FLD AUTO: 7.16 K/UL — SIGNIFICANT CHANGE UP (ref 3.8–10.5)
WBC # FLD AUTO: 7.16 K/UL — SIGNIFICANT CHANGE UP (ref 3.8–10.5)

## 2023-11-20 PROCEDURE — 99233 SBSQ HOSP IP/OBS HIGH 50: CPT

## 2023-11-20 RX ORDER — VALPROIC ACID (AS SODIUM SALT) 250 MG/5ML
250 SOLUTION, ORAL ORAL EVERY 8 HOURS
Refills: 0 | Status: DISCONTINUED | OUTPATIENT
Start: 2023-11-20 | End: 2023-11-20

## 2023-11-20 RX ADMIN — LIDOCAINE 1 PATCH: 4 CREAM TOPICAL at 07:29

## 2023-11-20 RX ADMIN — LIDOCAINE 1 PATCH: 4 CREAM TOPICAL at 00:12

## 2023-11-20 RX ADMIN — LIDOCAINE 1 PATCH: 4 CREAM TOPICAL at 14:40

## 2023-11-20 RX ADMIN — Medication 10 MILLIGRAM(S): at 04:24

## 2023-11-20 RX ADMIN — DULOXETINE HYDROCHLORIDE 30 MILLIGRAM(S): 30 CAPSULE, DELAYED RELEASE ORAL at 11:55

## 2023-11-20 RX ADMIN — Medication 1 CAPSULE(S): at 06:31

## 2023-11-20 RX ADMIN — TRAMADOL HYDROCHLORIDE 100 MILLIGRAM(S): 50 TABLET ORAL at 00:15

## 2023-11-20 RX ADMIN — Medication 650 MILLIGRAM(S): at 02:08

## 2023-11-20 RX ADMIN — Medication 10 MILLIGRAM(S): at 10:21

## 2023-11-20 RX ADMIN — TRAMADOL HYDROCHLORIDE 100 MILLIGRAM(S): 50 TABLET ORAL at 12:00

## 2023-11-20 RX ADMIN — TRAMADOL HYDROCHLORIDE 100 MILLIGRAM(S): 50 TABLET ORAL at 04:23

## 2023-11-20 RX ADMIN — TRAMADOL HYDROCHLORIDE 100 MILLIGRAM(S): 50 TABLET ORAL at 10:20

## 2023-11-20 RX ADMIN — Medication 1 CAPSULE(S): at 04:26

## 2023-11-20 RX ADMIN — TRAMADOL HYDROCHLORIDE 100 MILLIGRAM(S): 50 TABLET ORAL at 06:31

## 2023-11-20 RX ADMIN — Medication 1 CAPSULE(S): at 10:21

## 2023-11-20 RX ADMIN — LIDOCAINE 1 PATCH: 4 CREAM TOPICAL at 00:14

## 2023-11-20 RX ADMIN — Medication 1 CAPSULE(S): at 12:00

## 2023-11-20 RX ADMIN — LIDOCAINE 1 PATCH: 4 CREAM TOPICAL at 22:07

## 2023-11-20 RX ADMIN — Medication 55 MILLIGRAM(S): at 06:37

## 2023-11-20 RX ADMIN — LIDOCAINE 1 PATCH: 4 CREAM TOPICAL at 07:28

## 2023-11-20 NOTE — DISCHARGE NOTE PROVIDER - ATTENDING DISCHARGE PHYSICAL EXAMINATION:
MS: Oriented x3. Recent and remote recall intact. Fluent. Follows crossed commands. Patient able to verbalize past history.   CN: VFF. dysconjugate gaze. V1-V3 intact. Face symmetric. T/u midline. Should shrug intact bilaterally.   Motor: Normal tone. Full strength throughout.   Sensory: Intact to LT and PP throughout   Coordination: No dysmetria on FNF or ataxia on HTS bilaterally   Gait: Normal

## 2023-11-20 NOTE — DISCHARGE NOTE PROVIDER - CARE PROVIDER_API CALL
Tucker Oneil  Neurology  [address] [place] [address], [address]  [place] [address]  Phone: [phone]  Fax: [phone]  Follow Up Time:
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
## 2023-11-20 NOTE — DISCHARGE NOTE PROVIDER - NSDCMRMEDTOKEN_GEN_ALL_CORE_FT
acetaminophen 325 mg oral tablet: 2 tab(s) orally every 8 hours as needed for Mild Pain (1 - 3), Moderate Pain (4 - 6)  DULoxetine 30 mg oral delayed release capsule: 1 cap(s) orally once a day

## 2023-11-20 NOTE — PROGRESS NOTE ADULT - PROBLEM SELECTOR PLAN 1
x several days, a/w dizziness and blurry vision, c/b reported syncopal episode involving generalized weakness, full body tremors, and mixed aphasia lasting for ~15 minutes; no e/o stroke on MRI; cont. work-up and mgmt per Neuro; analgesics PRN; plan for EEG and TTE

## 2023-11-20 NOTE — DISCHARGE NOTE PROVIDER - HOSPITAL COURSE
Hospital course:  33y Female with PMH     Discharge NIHSS:     During this hospital course, patient had a (ischemic/hemorrhagic) stroke located in (left/right.....) as seen on (MRI/CT).   The stroke etiology is likely secondary to:  []atrial fibrillation  []small vessel disease from atherosclerotic risk factors  []other:  []etiology workup still in progress    Patient had the following workup done in house:  CT Head:   MR Head Non Contrast:  CT Angio Head:  CT Angio Neck:  []echo  []labs  []other    Physical exam at discharge:    New medications on discharge:  Labs to be followed up:  Imaging to be done as outpatient:  Further outpatient workup:   Hospital course:  33y Female with PMH anxiety and migraines (not officially diagnosed) presented to [facility] with severe headache x 3 days associated with dizziness and blurry vision, followed by syncopal episode  with generalized weakness, full body tremors and mixed aphasia lasting for 15 minutes. [facility] NIHSS 1. CT imaging unremarkable. MRI negative. Will complete syncopal workup with Echo and EEG. Headaches concerning for migraines vs. possible rebound headaches in the setting of frequent ibuprofen/tylenol use.      During this hospital course, patient did not have a stroke    Patient had the following workup done in house:  CT Brain Stroke Protocol (11.18.23 @ 16:37): No acute intracranial hemorrhage, demarcated territorial infarct, or   masslike lesion.  MR Head No Cont (11.19.23 @ 19:31):Unremarkable noncontrast MRI of the brain.  CT PERFUSION: Within normal limits  CTA INTRACRANIAL: Within normal limits. No arterial steno-occlusive   disease.  CTA EXTRACRANIAL: Within normal limits. No arterial steno-occlusive   disease or evidence of dissection.    []echo  []labs  []other  Physical exam at discharge:  -Mental status: Awake, alert, oriented to person, place, and time. Speech is fluent with intact naming, repetition, and comprehension, no dysarthria. Recent and remote memory intact. Follows commands. Attention/concentration intact. Fund of knowledge appropriate.  -Cranial nerves:   II: Visual fields are full to confrontation.  III, IV, VI: Extraocular movements are intact without nystagmus. Right eye esotropia (baseline, childhood strabismus), does not bury on right gaze. Pupils equally round and reactive to light  V:  Facial sensation V1-V3 equal and intact   VII: Subtle R NLFF  VIII: Hearing is bilaterally intact to finger rub  XII: Tongue protrudes midline  Motor: Normal bulk and tone. Strength LUE 3/5, RUE 4/5. LLE/RLE 4/5; pain with lifting LLE but no drift   Sensation: Decreased sensation along left arm and leg. No neglect or extinction on double simultaneous testing.  Coordination: No dysmetria on finger-to-nose bilaterally    New medications on discharge:  Labs to be followed up:  Imaging to be done as outpatient:  Further outpatient workup:   Hospital course:  33y Female with PMH anxiety and migraines (not officially diagnosed) presented to [facility] with severe headache x 3 days associated with dizziness and blurry vision, followed by syncopal episode  with generalized weakness, full body tremors and mixed aphasia lasting for 15 minutes. [facility] NIHSS 1. CT imaging unremarkable. MRI negative. Will complete syncopal workup with Echo and EEG. Headaches concerning for migraines vs. possible rebound headaches in the setting of frequent ibuprofen/tylenol use.      During this hospital course, patient did not have a stroke    Patient had the following workup done in house:  CT Brain Stroke Protocol (11.18.23 @ 16:37): No acute intracranial hemorrhage, demarcated territorial infarct, or   masslike lesion.  MR Head No Cont (11.19.23 @ 19:31):Unremarkable noncontrast MRI of the brain.  CT PERFUSION: Within normal limits  CTA INTRACRANIAL: Within normal limits. No arterial steno-occlusive   disease.  CTA EXTRACRANIAL: Within normal limits. No arterial steno-occlusive   disease or evidence of dissection.    []echo  Labs: A1C: 5.1, LDL: 105, TSH: 3.430  []other  Physical exam at discharge:  -Mental status: Awake, alert, oriented to person, place, and time. Speech is fluent with intact naming, repetition, and comprehension, no dysarthria. Recent and remote memory intact. Follows commands. Attention/concentration intact. Fund of knowledge appropriate.  -Cranial nerves:   II: Visual fields are full to confrontation.  III, IV, VI: Extraocular movements are intact without nystagmus. Right eye esotropia (baseline, childhood strabismus), does not bury on right gaze. Pupils equally round and reactive to light  V:  Facial sensation V1-V3 equal and intact   VII: Subtle R NLFF  VIII: Hearing is bilaterally intact to finger rub  XII: Tongue protrudes midline  Motor: Normal bulk and tone. Strength LUE 3/5, RUE 4/5. LLE/RLE 4/5; pain with lifting LLE but no drift   Sensation: Decreased sensation along left arm and leg. No neglect or extinction on double simultaneous testing.  Coordination: No dysmetria on finger-to-nose bilaterally    New medications on discharge: tylenol 650 q8h, duloxetine 30mg   Labs to be followed up:  Imaging to be done as outpatient:  Further outpatient workup:   Hospital course:  33y Female with PMH anxiety and migraines (not officially diagnosed) presented to [facility] with severe headache x 3 days associated with dizziness and blurry vision, followed by syncopal episode  with generalized weakness, full body tremors and mixed aphasia lasting for 15 minutes. [facility] NIHSS 1. CT imaging unremarkable. MRI negative. Will complete syncopal workup with Echo and EEG. Headaches more likely due to rebound headaches in the setting of frequent ibuprofen/tylenol use, may consider migraine.     During this hospital course, patient did not have a stroke    Patient had the following workup done in house:  CT Brain Stroke Protocol (11.18.23 @ 16:37): No acute intracranial hemorrhage, demarcated territorial infarct, or   masslike lesion.  MR Head No Cont (11.19.23 @ 19:31):Unremarkable noncontrast MRI of the brain.  CT PERFUSION: Within normal limits  CTA INTRACRANIAL: Within normal limits. No arterial steno-occlusive   disease.  CTA EXTRACRANIAL: Within normal limits. No arterial steno-occlusive   disease or evidence of dissection.    EEG: negative     Echocardiogram w/ Bubble and Doppler (11.21.23 @ 08:20) >   1. Normal left and right ventricular size and systolic function.   2. Injection with agitated saline reveals late bubbles (after 3 heart   beats) - difficult to determine intracardiac shunt vs pulmonary AV   malformation.   3. No significant valvular disease.   4. No evidence of pulmonary hypertension.   5. No pericardial effusion.   6. No prior echo is available for comparison.    Labs: A1C: 5.1, LDL: 105, TSH: 3.430    Physical exam at discharge:  -Mental status: Awake, alert, oriented to person, place, and time. Speech is fluent with intact naming, repetition, and comprehension, no dysarthria. Recent and remote memory intact. Follows commands. Attention/concentration intact. Fund of knowledge appropriate.  -Cranial nerves:   II: Visual fields are full to confrontation.  III, IV, VI: Extraocular movements are intact without nystagmus. Right eye esotropia (baseline, childhood strabismus), does not bury on right gaze. Pupils equally round and reactive to light  V:  Facial sensation V1-V3 equal and intact   VII: Subtle R NLFF  VIII: Hearing is bilaterally intact to finger rub  XII: Tongue protrudes midline  Motor: Normal bulk and tone. Strength LUE 3/5, RUE 4/5. LLE/RLE 4/5; pain with lifting LLE but no drift   Sensation: Decreased sensation along left arm and leg. No neglect or extinction on double simultaneous testing.  Coordination: No dysmetria on finger-to-nose bilaterally    New medications on discharge: tylenol 650 q8h, duloxetine 30mg   Labs to be followed up: none  Imaging to be done as outpatient: none  Further outpatient workup: none

## 2023-11-20 NOTE — PROGRESS NOTE ADULT - NS ATTEND AMEND GEN_ALL_CORE FT
33 year old woman w/ PMH of anxiety and migraines presented w/ headaches, syncopal episode, and full body tremor. Headaches improving. Neurological exam normal.     CTH negative  CTA h/n negative  MR brain negative  A1c 5.1    UTox neg    Imp: Headache likely migraine +/- withdrawal headache.     Plan:   Stop fiorocet and tradamdol   Continue Tylenol as needed  Likely discharge tomorrow     50 minutes spent on total encounter. The necessity of the time spent during the encounter on this date of service was due to:     Review of imaging and chart; obtaining history; examination of pt; discussion and coordination of care, and discussion of lifestyle modification and risk factor control.

## 2023-11-20 NOTE — DISCHARGE NOTE PROVIDER - NSDCCPCAREPLAN_GEN_ALL_CORE_FT
PRINCIPAL DISCHARGE DIAGNOSIS  Diagnosis: Headache  Assessment and Plan of Treatment: Migraines are painful, throbbing headaches that often start on one side of the head. They may cause nausea and vomiting and make you sensitive to light, sound, or smell. Complicated migraines can also cause symptoms such as weakness or numbness.   Without treatment, migraines can last from 4 hours to a few days. Medicines can help prevent migraines or stop them after they have started. Your doctor can help you find which ones work best for you. Please keep a headache diary between treatments.  Follow-up care is a key part of your treatment and safety. Be sure to make and go to all appointments, and call your doctor if you are having problems. k     PRINCIPAL DISCHARGE DIAGNOSIS  Diagnosis: Headache  Assessment and Plan of Treatment: El dolor de nory puede ser leve o intenso. Las causas comunes incluyen el estrés, medicamentos y lesiones en la nory. Problemas de sueño, alergias y cambios hormonales también pueden causar walker de nory. Puede que usted sufra de walker de nory frecuentes sin que se conozca aguilar causa. Es probable que el dolor empiece en otra parte de aguilar cuerpo y pase a aguilar nory. El dolor de nory también puede moverse hacia otras partes de aguilar cuerpo. Un dolor de nory puede causar otros síntomas, issa náusea y vómito. Un dolor de nory constance puede incluso ser debra señal de derrame cerebral u otro problema madison que necesita de tratamiento inmediato.  Mantenga un registro de concha walker de nory:Escriba en el diario las fechas y las horas en que usted sufre un dolor de nory y lo que estaba haciendo antes de que empezara. Registre también lo que comió y bebió jeaneth las 24 horas previas a que comenzara el dolor de nory. [place] puede ayudar a aguilar médico a encontrar la causa de concha walker de nory y así poder crear un plan de tratamiento. Concha anotaciones también pueden ayudarle a evitar lo que provoca concha walker de nory o manejar concha síntomas.  Por favor no tome mas de santy pastillas de [place] por semana!!
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
## 2023-11-21 VITALS
SYSTOLIC BLOOD PRESSURE: 122 MMHG | RESPIRATION RATE: 16 BRPM | DIASTOLIC BLOOD PRESSURE: 83 MMHG | OXYGEN SATURATION: 99 % | HEART RATE: 94 BPM

## 2023-11-21 LAB
ANION GAP SERPL CALC-SCNC: 10 MMOL/L — SIGNIFICANT CHANGE UP (ref 5–17)
ANION GAP SERPL CALC-SCNC: 10 MMOL/L — SIGNIFICANT CHANGE UP (ref 5–17)
BUN SERPL-MCNC: 9 MG/DL — SIGNIFICANT CHANGE UP (ref 7–23)
BUN SERPL-MCNC: 9 MG/DL — SIGNIFICANT CHANGE UP (ref 7–23)
CALCIUM SERPL-MCNC: 9 MG/DL — SIGNIFICANT CHANGE UP (ref 8.4–10.5)
CALCIUM SERPL-MCNC: 9 MG/DL — SIGNIFICANT CHANGE UP (ref 8.4–10.5)
CHLORIDE SERPL-SCNC: 102 MMOL/L — SIGNIFICANT CHANGE UP (ref 96–108)
CHLORIDE SERPL-SCNC: 102 MMOL/L — SIGNIFICANT CHANGE UP (ref 96–108)
CO2 SERPL-SCNC: 25 MMOL/L — SIGNIFICANT CHANGE UP (ref 22–31)
CO2 SERPL-SCNC: 25 MMOL/L — SIGNIFICANT CHANGE UP (ref 22–31)
CREAT SERPL-MCNC: 0.75 MG/DL — SIGNIFICANT CHANGE UP (ref 0.5–1.3)
CREAT SERPL-MCNC: 0.75 MG/DL — SIGNIFICANT CHANGE UP (ref 0.5–1.3)
EGFR: 106 ML/MIN/1.73M2 — SIGNIFICANT CHANGE UP
EGFR: 106 ML/MIN/1.73M2 — SIGNIFICANT CHANGE UP
GLUCOSE SERPL-MCNC: 80 MG/DL — SIGNIFICANT CHANGE UP (ref 70–99)
GLUCOSE SERPL-MCNC: 80 MG/DL — SIGNIFICANT CHANGE UP (ref 70–99)
HCT VFR BLD CALC: 41.3 % — SIGNIFICANT CHANGE UP (ref 34.5–45)
HCT VFR BLD CALC: 41.3 % — SIGNIFICANT CHANGE UP (ref 34.5–45)
HGB BLD-MCNC: 13.8 G/DL — SIGNIFICANT CHANGE UP (ref 11.5–15.5)
HGB BLD-MCNC: 13.8 G/DL — SIGNIFICANT CHANGE UP (ref 11.5–15.5)
MAGNESIUM SERPL-MCNC: 2.3 MG/DL — SIGNIFICANT CHANGE UP (ref 1.6–2.6)
MAGNESIUM SERPL-MCNC: 2.3 MG/DL — SIGNIFICANT CHANGE UP (ref 1.6–2.6)
MCHC RBC-ENTMCNC: 27.3 PG — SIGNIFICANT CHANGE UP (ref 27–34)
MCHC RBC-ENTMCNC: 27.3 PG — SIGNIFICANT CHANGE UP (ref 27–34)
MCHC RBC-ENTMCNC: 33.4 GM/DL — SIGNIFICANT CHANGE UP (ref 32–36)
MCHC RBC-ENTMCNC: 33.4 GM/DL — SIGNIFICANT CHANGE UP (ref 32–36)
MCV RBC AUTO: 81.8 FL — SIGNIFICANT CHANGE UP (ref 80–100)
MCV RBC AUTO: 81.8 FL — SIGNIFICANT CHANGE UP (ref 80–100)
NRBC # BLD: 0 /100 WBCS — SIGNIFICANT CHANGE UP (ref 0–0)
NRBC # BLD: 0 /100 WBCS — SIGNIFICANT CHANGE UP (ref 0–0)
PHOSPHATE SERPL-MCNC: 3 MG/DL — SIGNIFICANT CHANGE UP (ref 2.5–4.5)
PHOSPHATE SERPL-MCNC: 3 MG/DL — SIGNIFICANT CHANGE UP (ref 2.5–4.5)
PLATELET # BLD AUTO: 328 K/UL — SIGNIFICANT CHANGE UP (ref 150–400)
PLATELET # BLD AUTO: 328 K/UL — SIGNIFICANT CHANGE UP (ref 150–400)
POTASSIUM SERPL-MCNC: 3.8 MMOL/L — SIGNIFICANT CHANGE UP (ref 3.5–5.3)
POTASSIUM SERPL-MCNC: 3.8 MMOL/L — SIGNIFICANT CHANGE UP (ref 3.5–5.3)
POTASSIUM SERPL-SCNC: 3.8 MMOL/L — SIGNIFICANT CHANGE UP (ref 3.5–5.3)
POTASSIUM SERPL-SCNC: 3.8 MMOL/L — SIGNIFICANT CHANGE UP (ref 3.5–5.3)
RBC # BLD: 5.05 M/UL — SIGNIFICANT CHANGE UP (ref 3.8–5.2)
RBC # BLD: 5.05 M/UL — SIGNIFICANT CHANGE UP (ref 3.8–5.2)
RBC # FLD: 13.4 % — SIGNIFICANT CHANGE UP (ref 10.3–14.5)
RBC # FLD: 13.4 % — SIGNIFICANT CHANGE UP (ref 10.3–14.5)
SODIUM SERPL-SCNC: 137 MMOL/L — SIGNIFICANT CHANGE UP (ref 135–145)
SODIUM SERPL-SCNC: 137 MMOL/L — SIGNIFICANT CHANGE UP (ref 135–145)
WBC # BLD: 8.12 K/UL — SIGNIFICANT CHANGE UP (ref 3.8–10.5)
WBC # BLD: 8.12 K/UL — SIGNIFICANT CHANGE UP (ref 3.8–10.5)
WBC # FLD AUTO: 8.12 K/UL — SIGNIFICANT CHANGE UP (ref 3.8–10.5)
WBC # FLD AUTO: 8.12 K/UL — SIGNIFICANT CHANGE UP (ref 3.8–10.5)

## 2023-11-21 PROCEDURE — 97166 OT EVAL MOD COMPLEX 45 MIN: CPT

## 2023-11-21 PROCEDURE — 99291 CRITICAL CARE FIRST HOUR: CPT

## 2023-11-21 PROCEDURE — 83735 ASSAY OF MAGNESIUM: CPT

## 2023-11-21 PROCEDURE — 93971 EXTREMITY STUDY: CPT

## 2023-11-21 PROCEDURE — 84443 ASSAY THYROID STIM HORMONE: CPT

## 2023-11-21 PROCEDURE — 80053 COMPREHEN METABOLIC PANEL: CPT

## 2023-11-21 PROCEDURE — 82962 GLUCOSE BLOOD TEST: CPT

## 2023-11-21 PROCEDURE — 85027 COMPLETE CBC AUTOMATED: CPT

## 2023-11-21 PROCEDURE — 83036 HEMOGLOBIN GLYCOSYLATED A1C: CPT

## 2023-11-21 PROCEDURE — 99238 HOSP IP/OBS DSCHRG MGMT 30/<: CPT

## 2023-11-21 PROCEDURE — 97530 THERAPEUTIC ACTIVITIES: CPT

## 2023-11-21 PROCEDURE — 99233 SBSQ HOSP IP/OBS HIGH 50: CPT

## 2023-11-21 PROCEDURE — 97161 PT EVAL LOW COMPLEX 20 MIN: CPT

## 2023-11-21 PROCEDURE — 80048 BASIC METABOLIC PNL TOTAL CA: CPT

## 2023-11-21 PROCEDURE — 85025 COMPLETE CBC W/AUTO DIFF WBC: CPT

## 2023-11-21 PROCEDURE — 70496 CT ANGIOGRAPHY HEAD: CPT

## 2023-11-21 PROCEDURE — 80307 DRUG TEST PRSMV CHEM ANLYZR: CPT

## 2023-11-21 PROCEDURE — 70498 CT ANGIOGRAPHY NECK: CPT

## 2023-11-21 PROCEDURE — 93306 TTE W/DOPPLER COMPLETE: CPT | Mod: 26

## 2023-11-21 PROCEDURE — 0042T: CPT

## 2023-11-21 PROCEDURE — 95716 VEEG EA 12-26HR CONT MNTR: CPT

## 2023-11-21 PROCEDURE — 70450 CT HEAD/BRAIN W/O DYE: CPT

## 2023-11-21 PROCEDURE — 95700 EEG CONT REC W/VID EEG TECH: CPT

## 2023-11-21 PROCEDURE — 80061 LIPID PANEL: CPT

## 2023-11-21 PROCEDURE — 81001 URINALYSIS AUTO W/SCOPE: CPT

## 2023-11-21 PROCEDURE — 81025 URINE PREGNANCY TEST: CPT

## 2023-11-21 PROCEDURE — 84100 ASSAY OF PHOSPHORUS: CPT

## 2023-11-21 PROCEDURE — 95720 EEG PHY/QHP EA INCR W/VEEG: CPT

## 2023-11-21 PROCEDURE — 84484 ASSAY OF TROPONIN QUANT: CPT

## 2023-11-21 PROCEDURE — 93306 TTE W/DOPPLER COMPLETE: CPT

## 2023-11-21 PROCEDURE — 97116 GAIT TRAINING THERAPY: CPT

## 2023-11-21 PROCEDURE — 70551 MRI BRAIN STEM W/O DYE: CPT

## 2023-11-21 PROCEDURE — 36415 COLL VENOUS BLD VENIPUNCTURE: CPT

## 2023-11-21 RX ORDER — DULOXETINE HYDROCHLORIDE 30 MG/1
1 CAPSULE, DELAYED RELEASE ORAL
Qty: 30 | Refills: 2
Start: 2023-11-21 | End: 2024-02-18

## 2023-11-21 RX ORDER — ACETAMINOPHEN 500 MG
2 TABLET ORAL
Qty: 180 | Refills: 0
Start: 2023-11-21 | End: 2023-12-20

## 2023-11-21 RX ADMIN — LIDOCAINE 1 PATCH: 4 CREAM TOPICAL at 06:18

## 2023-11-21 RX ADMIN — DULOXETINE HYDROCHLORIDE 30 MILLIGRAM(S): 30 CAPSULE, DELAYED RELEASE ORAL at 12:13

## 2023-11-21 NOTE — PROGRESS NOTE ADULT - PROBLEM SELECTOR PLAN 1
x several days, a/w dizziness and blurry vision, c/b reported syncopal episode involving generalized weakness, full body tremors, and mixed aphasia lasting for ~15 minutes; HA now resolved; no e/o stroke on MRI; cont. work-up and mgmt per Neuro; analgesics PRN; EEG in progress

## 2023-11-21 NOTE — PROGRESS NOTE ADULT - PROBLEM SELECTOR PLAN 2
+life stressors; cont. supportive care; Pt. offered but declined Psych c/s; Pt. on SSRI for sciatica, which may also help w/ anxiety
+life stressors; cont. supportive care; Pt. offered but declined Psych c/s; Pt. on SSRI for sciatica, which may also help w/ anxiety

## 2023-11-21 NOTE — PROGRESS NOTE ADULT - NSPROGADDITIONALINFOA_GEN_ALL_CORE
DVT ppx: LMWH  Dispo: home w/ outpatient PT pending further work-up
DVT ppx: LMWH  Dispo: home w/ outpatient PT pending EEG read

## 2023-11-21 NOTE — DISCHARGE NOTE NURSING/CASE MANAGEMENT/SOCIAL WORK - NSDCPEFALRISK_GEN_ALL_CORE
For information on Fall & Injury Prevention, visit: [email or web address] OR  [email or web address] OR  https://www.cdc.gov/steadi/patient.html
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
## 2023-11-21 NOTE — PROGRESS NOTE ADULT - TIME BILLING
direct patient encounter  reviewed labs and images  d/w Stroke ACP
direct patient encounter  reviewed labs and images  d/w Stroke ACP

## 2023-11-21 NOTE — DISCHARGE NOTE NURSING/CASE MANAGEMENT/SOCIAL WORK - PATIENT PORTAL LINK FT
You can access the FollowMyHealth Patient Portal offered by [facility] by registering at the following website: [email or web address]. By joining [employer]’s FollowMyHealth portal, you will also be able to view your health information using other applications (apps) compatible with our system.

## 2023-11-21 NOTE — EEG REPORT - NS EEG TEXT BOX
[facility] Department of Neurology  Inpatient Continuous video-Electroencephalogram  [address] [place] [address], [address] [place] [id number], T: [phone]    Patient Name:	RAMSES ANAYA    :	1990  MRN:	[id number]    Study Start Date/Time:	2023, 12:03:04 PM  Study End Date/Time:    Referred by:    Dr. Jennifer Cid    Brief Clinical History:  RAMSES ANAYA is a 33 year old Female progressive headache, body tremors and syncopal spell; study performed to investigate for seizures or markers of epilepsy.   Technologist notes: -  Diagnosis Code:  R56.9 convulsions/seizure    Pertinent Medication:  n/a    Acquisition Details:  Electroencephalography was acquired using a minimum of 21 channels on an [email or web address] Neu[employer] system v 9.3.1 with electrode placement according to the standard International 10-20 system following ACNS (American Clinical Neurophysiology Society) guidelines.  Anterior temporal T1 and T2 electrodes were utilized whenever possible.  The [employer] automated spike & seizure detections were all reviewed in detail, in addition to the entire raw EEG.    Findings:  Day 1:  2023, 12:03:04 PM to next morning at 07:00 AM   Background:  continuous, with predominantly alpha and beta frequencies.  Generalized Slowing:  None  Symmetry/Focality: No persistent asymmetries of voltage or frequency.     Voltage:  Normal (20+ uV)  Organization:  Appropriate anterior-posterior gradient  Posterior Dominant Rhythm:  12 Hz symmetric, well-organized, and well-modulated  Sleep:  Symmetric, synchronous spindles and K complexes.  Variability:   Yes		Reactivity:  Yes    Spontaneous Activity:  No epileptiform discharges   Events:  1)	No electrographic seizures or significant clinical events occurred during this study.  Provocations:  •	Hyperventilation: was not performed.  •	Photic stimulation: was not performed.  Daily Summary:    1)	Unremarkable awake and sleep recording.  2)	There were no findings of active epilepsy, however this alone does not rule out the diagnosis.         Rashid Moncada MD  Attending Neurologist, [facility] Epilepsy Program     [facility] Department of Neurology  Inpatient Continuous video-Electroencephalogram  [address], [address] [place] [id number], T: [phone]    Patient Name:	RAMSES ANAYA    :	1990  MRN:	[id number]    Study Start Date/Time:	2023, 12:03:04 PM  Study End Date/Time:	2023, 09:56 AM    Referred by:    Dr. Jennifer Cid    Brief Clinical History:  RAMSES ANAYA is a 33 year old Female progressive headache, body tremors and syncopal spell; study performed to investigate for seizures or markers of epilepsy.   Technologist notes: -  Diagnosis Code:  R56.9 convulsions/seizure    Pertinent Medication:  n/a    Acquisition Details:  Electroencephalography was acquired using a minimum of 21 channels on an [employer] Neu[employer] system v 9.3.1 with electrode placement according to the standard International 10-20 system following ACNS (American Clinical Neurophysiology Society) guidelines.  Anterior temporal T1 and T2 electrodes were utilized whenever possible.  The [employer]K automated spike & seizure detections were all reviewed in detail, in addition to the entire raw EEG.    Findings:  Day 1:  2023, 12:03:04 PM to next morning at 09:56 AM   Background:  continuous, with predominantly alpha and beta frequencies.  Generalized Slowing:  None  Symmetry/Focality: No persistent asymmetries of voltage or frequency.     Voltage:  Normal (20+ uV)  Organization:  Appropriate anterior-posterior gradient  Posterior Dominant Rhythm:  12 Hz symmetric, well-organized, and well-modulated  Sleep:  Symmetric, synchronous spindles and K complexes.  Variability:   Yes		Reactivity:  Yes    Spontaneous Activity:  No epileptiform discharges   Events:  1)	No electrographic seizures or significant clinical events occurred during this study.  Provocations:  •	Hyperventilation: was not performed.  •	Photic stimulation: was not performed.    FINAL Impression:  Normal Continuous/long-term video-EEG  1)	No EEG abnormalities were identified during this recording.     Final Clinical Correlation:  1)	There were no findings of active epilepsy, however this alone does not rule out the diagnosis.      Rashid Moncada MD  Attending Neurologist, [facility] Epilepsy Program

## 2023-11-24 DIAGNOSIS — Z53.20 PROCEDURE AND TREATMENT NOT CARRIED OUT BECAUSE OF PATIENT'S DECISION FOR UNSPECIFIED REASONS: ICD-10-CM

## 2023-11-24 DIAGNOSIS — H50.00 UNSPECIFIED ESOTROPIA: ICD-10-CM

## 2023-11-24 DIAGNOSIS — M54.50 LOW BACK PAIN, UNSPECIFIED: ICD-10-CM

## 2023-11-24 DIAGNOSIS — M54.30 SCIATICA, UNSPECIFIED SIDE: ICD-10-CM

## 2023-11-24 DIAGNOSIS — T39.315A ADVERSE EFFECT OF PROPIONIC ACID DERIVATIVES, INITIAL ENCOUNTER: ICD-10-CM

## 2023-11-24 DIAGNOSIS — E87.1 HYPO-OSMOLALITY AND HYPONATREMIA: ICD-10-CM

## 2023-11-24 DIAGNOSIS — Z91.81 HISTORY OF FALLING: ICD-10-CM

## 2023-11-24 DIAGNOSIS — R55 SYNCOPE AND COLLAPSE: ICD-10-CM

## 2023-11-24 DIAGNOSIS — G43.909 MIGRAINE, UNSPECIFIED, NOT INTRACTABLE, WITHOUT STATUS MIGRAINOSUS: ICD-10-CM

## 2023-11-24 DIAGNOSIS — M79.662 PAIN IN LEFT LOWER LEG: ICD-10-CM
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
## 2023-11-24 DIAGNOSIS — F41.9 ANXIETY DISORDER, UNSPECIFIED: ICD-10-CM

## 2023-11-24 DIAGNOSIS — G44.40 DRUG-INDUCED HEADACHE, NOT ELSEWHERE CLASSIFIED, NOT INTRACTABLE: ICD-10-CM

## 2023-11-24 DIAGNOSIS — R51.9 HEADACHE, UNSPECIFIED: ICD-10-CM
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
## 2023-11-24 DIAGNOSIS — Z91.42: ICD-10-CM

## 2023-11-24 DIAGNOSIS — H53.149 VISUAL DISCOMFORT, UNSPECIFIED: ICD-10-CM

## 2023-11-27 PROBLEM — Z00.00 ENCOUNTER FOR PREVENTIVE HEALTH EXAMINATION: Status: ACTIVE | Noted: 2023-11-27

## 2024-08-08 NOTE — PATIENT PROFILE ADULT - FUNCTIONAL ASSESSMENT - BASIC MOBILITY 5.
GA ok'd by MD. Pt provided with prescription medications from pharmacy (meds to beds); pt given written and sign language discharge education regarding follow up, prescription medications, return criteria and vaccine education. Pt demonstrates understanding. Pt a&ox4, resps even and unlabored, ambulatory with steady gait at discharge. Pt given bus pass for discharge by social work. Shaye at [place].    3 = A little assistance

## 2025-03-03 NOTE — PHYSICAL THERAPY INITIAL EVALUATION ADULT - RANGE OF MOTION EXAMINATION, REHAB EVAL
Writer called patient and discussed recommendations per PCP. Patient states that it is exactly the same as previous hernia. Informed him of provider recommendations to reach out to surgeon and patient is agreeable to plan and will call surgeon, will let PCP know if a referral is needed.    Anabelle Powers, KALPANAN, RN  [facility]     bilateral upper extremity ROM was WFL (within functional limits)/bilateral lower extremity ROM was WFL (within functional limits)